# Patient Record
Sex: MALE | Race: WHITE | NOT HISPANIC OR LATINO | Employment: OTHER | URBAN - METROPOLITAN AREA
[De-identification: names, ages, dates, MRNs, and addresses within clinical notes are randomized per-mention and may not be internally consistent; named-entity substitution may affect disease eponyms.]

---

## 2017-02-23 ENCOUNTER — ALLSCRIPTS OFFICE VISIT (OUTPATIENT)
Dept: OTHER | Facility: OTHER | Age: 72
End: 2017-02-23

## 2017-02-23 DIAGNOSIS — R06.2 WHEEZING: ICD-10-CM

## 2017-02-23 DIAGNOSIS — I10 ESSENTIAL (PRIMARY) HYPERTENSION: ICD-10-CM

## 2017-02-24 ENCOUNTER — APPOINTMENT (OUTPATIENT)
Dept: LAB | Facility: CLINIC | Age: 72
End: 2017-02-24
Payer: MEDICARE

## 2017-02-24 ENCOUNTER — TRANSCRIBE ORDERS (OUTPATIENT)
Dept: LAB | Facility: CLINIC | Age: 72
End: 2017-02-24

## 2017-02-24 ENCOUNTER — GENERIC CONVERSION - ENCOUNTER (OUTPATIENT)
Dept: OTHER | Facility: OTHER | Age: 72
End: 2017-02-24

## 2017-02-24 DIAGNOSIS — I10 ESSENTIAL (PRIMARY) HYPERTENSION: ICD-10-CM

## 2017-02-24 DIAGNOSIS — R06.2 WHEEZING: ICD-10-CM

## 2017-02-24 LAB
ANION GAP SERPL CALCULATED.3IONS-SCNC: 9 MMOL/L (ref 4–13)
BUN SERPL-MCNC: 18 MG/DL (ref 5–25)
CALCIUM SERPL-MCNC: 9.2 MG/DL (ref 8.3–10.1)
CHLORIDE SERPL-SCNC: 104 MMOL/L (ref 100–108)
CO2 SERPL-SCNC: 28 MMOL/L (ref 21–32)
CREAT SERPL-MCNC: 1.18 MG/DL (ref 0.6–1.3)
GFR SERPL CREATININE-BSD FRML MDRD: >60 ML/MIN/1.73SQ M
GLUCOSE SERPL-MCNC: 111 MG/DL (ref 65–140)
NT-PROBNP SERPL-MCNC: 110 PG/ML
POTASSIUM SERPL-SCNC: 4.7 MMOL/L (ref 3.5–5.3)
SODIUM SERPL-SCNC: 141 MMOL/L (ref 136–145)

## 2017-02-24 PROCEDURE — 36415 COLL VENOUS BLD VENIPUNCTURE: CPT

## 2017-02-24 PROCEDURE — 83880 ASSAY OF NATRIURETIC PEPTIDE: CPT

## 2017-02-24 PROCEDURE — 80048 BASIC METABOLIC PNL TOTAL CA: CPT

## 2017-03-02 ENCOUNTER — ALLSCRIPTS OFFICE VISIT (OUTPATIENT)
Dept: OTHER | Facility: OTHER | Age: 72
End: 2017-03-02

## 2017-03-03 LAB
A/G RATIO (HISTORICAL): 1.9 (CALC) (ref 1–2.5)
ALBUMIN SERPL BCP-MCNC: 4.7 G/DL (ref 3.6–5.1)
ALP SERPL-CCNC: 65 U/L (ref 40–115)
ALT SERPL W P-5'-P-CCNC: 21 U/L (ref 9–46)
AST SERPL W P-5'-P-CCNC: 20 U/L (ref 10–35)
BILIRUB SERPL-MCNC: 1 MG/DL (ref 0.2–1.2)
BUN SERPL-MCNC: 22 MG/DL (ref 7–25)
BUN/CREA RATIO (HISTORICAL): ABNORMAL (CALC) (ref 6–22)
CALCIUM SERPL-MCNC: 10.1 MG/DL (ref 8.6–10.3)
CHLORIDE SERPL-SCNC: 99 MMOL/L (ref 98–110)
CHOLEST SERPL-MCNC: 128 MG/DL (ref 125–200)
CHOLEST/HDLC SERPL: 3.7 (CALC)
CO2 SERPL-SCNC: 26 MMOL/L (ref 20–31)
CREAT SERPL-MCNC: 1.16 MG/DL (ref 0.7–1.18)
DEPRECATED RDW RBC AUTO: 13.8 % (ref 11–15)
EGFR AFRICAN AMERICAN (HISTORICAL): 73 ML/MIN/1.73M2
EGFR-AMERICAN CALC (HISTORICAL): 63 ML/MIN/1.73M2
GAMMA GLOBULIN (HISTORICAL): 2.5 G/DL (CALC) (ref 1.9–3.7)
GLUCOSE (HISTORICAL): 123 MG/DL (ref 65–99)
HCT VFR BLD AUTO: 43.5 % (ref 38.5–50)
HDLC SERPL-MCNC: 35 MG/DL
HGB BLD-MCNC: 14.2 G/DL (ref 13.2–17.1)
LDL CHOLESTEROL (HISTORICAL): 62 MG/DL (CALC)
MCH RBC QN AUTO: 28.6 PG (ref 27–33)
MCHC RBC AUTO-ENTMCNC: 32.7 G/DL (ref 32–36)
MCV RBC AUTO: 87.5 FL (ref 80–100)
NON-HDL-CHOL (CHOL-HDL) (HISTORICAL): 93 MG/DL (CALC)
PLATELET # BLD AUTO: 191 THOUSAND/UL (ref 140–400)
PMV BLD AUTO: 10.3 FL (ref 7.5–12.5)
POTASSIUM SERPL-SCNC: 4.5 MMOL/L (ref 3.5–5.3)
RBC # BLD AUTO: 4.98 MILLION/UL (ref 4.2–5.8)
SODIUM SERPL-SCNC: 137 MMOL/L (ref 135–146)
TOTAL PROTEIN (HISTORICAL): 7.2 G/DL (ref 6.1–8.1)
TRIGL SERPL-MCNC: 157 MG/DL
TSH SERPL DL<=0.05 MIU/L-ACNC: 1.63 MIU/L (ref 0.4–4.5)
WBC # BLD AUTO: 7.1 THOUSAND/UL (ref 3.8–10.8)

## 2017-03-07 ENCOUNTER — GENERIC CONVERSION - ENCOUNTER (OUTPATIENT)
Dept: OTHER | Facility: OTHER | Age: 72
End: 2017-03-07

## 2018-01-10 NOTE — RESULT NOTES
Verified Results  (1) NT- BNP (PRO BRAIN NATRIURETIC PEPTIDE) [id number] 09:12AM Verneta Deter Order Number: [id number]     Test Name Result Flag Reference   NT-PRO  pg/mL  <125     (1) BASIC METABOLIC PROFILE [id number] 09:12AM Verneta Deter Order Number: [id number]     Test Name Result Flag Reference   GLUCOSE,RANDM 111 mg/dL     If the patient is fasting, the ADA then defines impaired fasting glucose as > 100 mg/dL and diabetes as > or equal to 123 mg/dL  SODIUM 141 mmol/L  136-145   POTASSIUM 4 7 mmol/L  3 5-5 3   CHLORIDE 104 mmol/L  100-108   CARBON DIOXIDE 28 mmol/L  21-32   ANION GAP (CALC) 9 mmol/L  4-13   BLOOD UREA NITROGEN 18 mg/dL  5-25   CREATININE 1 18 mg/dL  0 60-1 30   Standardized to IDMS reference method   CALCIUM 9 2 mg/dL  8 3-10 1   eGFR Non-African American      >60 0 ml/min/1 73sq [facility] [employer] Disease Education Program recommendations are as follows:  GFR calculation is accurate only with a steady state creatinine  Chronic Kidney disease less than 60 ml/min/1 73 sq  meters  Kidney failure less than 15 ml/min/1 73 sq  meters

## 2018-01-12 VITALS
TEMPERATURE: 97.1 F | DIASTOLIC BLOOD PRESSURE: 79 MMHG | SYSTOLIC BLOOD PRESSURE: 122 MMHG | HEIGHT: 73 IN | WEIGHT: 248 LBS | BODY MASS INDEX: 32.87 KG/M2

## 2018-01-12 NOTE — PROGRESS NOTES
Chief Complaint  Pt here for EKG for newly diagnosed afib  I completed an EKG  Pt was very upset when he came into office so I waited about 10 minutes to take his pressure and EKG      Active Problems    1  Actinic keratosis (702 0) (L57 0)   2  Benign essential hypertension (401 1) (I10)   3  Cough (786 2) (R05)   4  Diabetes mellitus (250 00) (E11 9)   5  Flu vaccine need (V04 81) (Z23)   6  Palpitation (785 1) (R00 2)   7  Paroxysmal atrial fibrillation (427 31) (I48 0)    Current Meds   1  GlipiZIDE ER 10 MG Oral Tablet Extended Release 24 Hour; Take 1 tablet daily; Therapy: 79UNW5034 to (Last Rx:51Kih6770) Ordered   2  Hydrochlorothiazide 25 MG Oral Tablet; Take 1 tablet daily; Therapy: 52NQD4383 to (Last Rx:85Nyk2202) Ordered   3  Losartan Potassium 100 MG Oral Tablet; TAKE 1 TABLET DAILY; Therapy: 05IJS7488 to (Evaluate:44Mbi7628); Last Rx:04Oct2016 Ordered   4  MetFORMIN HCl - 1000 MG Oral Tablet; Therapy: (Recorded:17Esg6048) to Recorded   5  Metoprolol Succinate ER 25 MG Oral Tablet Extended Release 24 Hour; Take 1 tablet   daily; Therapy: 65FHL6217 to (Evaluate:17Jdd0134)  Requested for: 04Oct2016; Last   Rx:90Yap1660 Ordered   6  Pradaxa 150 MG Oral Capsule; Therapy: (Recorded:10Drr2261) to Recorded   7  Savaysa 60 MG Oral Tablet; Take 1 tablet daily; Therapy: 55YQP4023 to (Abdi Lowers)  Requested for: 03QHL4549; Last   Rx:05Oct2016 Ordered    Allergies    1  No Known Drug Allergies    Vitals  Signs    Systolic: 149  Diastolic: 90  Heart Rate: 74  Height: 6 ft 1 in  Weight: 241 lb 6 oz  BMI Calculated: 31 85  BSA Calculated: 2 33  BP Cuff Size: Large    Future Appointments    Date/Time Provider Specialty Site   11/10/2016 04:00 PM CHASITY Saldaña   Cardiology [address]     Signatures   Electronically signed by : CHASITY Lancaster ; Oct 13 2016 11:02AM EST                       (Author)

## 2018-01-13 VITALS
WEIGHT: 248 LBS | HEIGHT: 73 IN | DIASTOLIC BLOOD PRESSURE: 60 MMHG | BODY MASS INDEX: 32.87 KG/M2 | TEMPERATURE: 97.1 F | SYSTOLIC BLOOD PRESSURE: 100 MMHG

## 2018-01-16 NOTE — RESULT NOTES
Verified Results  (1) COMPREHENSIVE METABOLIC PANEL 23LNX8428 73:19WK Eddy Aguilar     Test Name Result Flag Reference   GLUCOSE 123 mg/dL H 65-99   Fasting reference interval   UREA NITROGEN (BUN) 22 mg/dL  7-25   CREATININE 1 16 mg/dL  0 70-1 18   For patients >52years of age, the reference limit  for Creatinine is approximately 13% higher for people  identified as -American  eGFR NON-AFR  AMERICAN 63 mL/min/1 73m2  > OR = 60   eGFR AFRICAN AMERICAN 73 mL/min/1 73m2  > OR = 60   BUN/CREATININE RATIO   2-55   NOT APPLICABLE (calc)   SODIUM 137 mmol/L  135-146   POTASSIUM 4 5 mmol/L  3 5-5 3   CHLORIDE 99 mmol/L     CARBON DIOXIDE 26 mmol/L  20-31   CALCIUM 10 1 mg/dL  8 6-10 3   PROTEIN, TOTAL 7 2 g/dL  6 1-8 1   ALBUMIN 4 7 g/dL  3 6-5 1   GLOBULIN 2 5 g/dL (calc)  1 9-3 7   ALBUMIN/GLOBULIN RATIO 1 9 (calc)  1 0-2 5   BILIRUBIN, TOTAL 1 0 mg/dL  0 2-1 2   ALKALINE PHOSPHATASE 65 U/L     AST 20 U/L  10-35   ALT 21 U/L  9-46     (Q) CBC (H/H, RBC, INDICES, WBC, PLT) [id number] 12:00AM Carl Ca     Test Name Result Flag Reference   WHITE BLOOD CELL COUNT 7 1 Thousand/uL  3 8-10 8   RED BLOOD CELL COUNT 4 98 Million/uL  4 20-5 80   HEMOGLOBIN 14 2 g/dL  13 2-17 1   HEMATOCRIT 43 5 %  38 5-50 0   MCV 87 5 fL  80 0-100 0   MCH 28 6 pg  27 0-33 0   MCHC 32 7 g/dL  32 0-36 0   RDW 13 8 %  11 0-15 0   PLATELET COUNT 174 Thousand/uL  140-400   MPV 10 3 fL  7 5-12 5     (1) LIPID PANEL, FASTING 85HGQ0012 12:00AM Carl Ca     Test Name Result Flag Reference   CHOLESTEROL, TOTAL 128 mg/dL  125-200   HDL CHOLESTEROL 35 mg/dL L > OR = 40   TRIGLICERIDES 308 mg/dL H <150   LDL-CHOLESTEROL 62 mg/dL (calc)  <130   Desirable range <100 mg/dL for patients with CHD or  diabetes and <70 mg/dL for diabetic patients with  known heart disease  CHOL/HDLC RATIO 3 7 (calc)  < OR = 5 0   NON HDL CHOLESTEROL 93 mg/dL (calc)     Target for non-HDL cholesterol is 30 mg/dL higher than   LDL cholesterol target  (Q) TSH, 3RD GENERATION [id number] 12:00AM Amaya Anderson     Test Name Result Flag Reference   TSH 1 63 mIU/L  0 40-4 50

## 2018-01-18 NOTE — RESULT NOTES
Message   Please tell him chest x-ray was clear     Verified Results  * XR CHEST PA & LATERAL [id number] 03:07PM Madeline Alpers Order Number: [id number]     Test Name Result Flag Reference   XR CHEST PA & LATERAL (Report)     CHEST - DUAL ENERGY     INDICATION: Chronic cough     COMPARISON: None     VIEWS: PA (including soft tissue/bone algorithms) and lateral projections; 5 images     FINDINGS:        Cardiomediastinal silhouette appears unremarkable  The lungs are clear  No pneumothorax or pleural effusion  Visualized osseous structures appear within normal limits for the patient's age  IMPRESSION:     No active pulmonary disease         Workstation performed: [id number]     Signed by:   Pauline Torres MD   9/30/16

## 2018-08-03 ENCOUNTER — APPOINTMENT (RX ONLY)
Dept: URBAN - METROPOLITAN AREA CLINIC 101 | Facility: CLINIC | Age: 73
Setting detail: DERMATOLOGY
End: 2018-08-03

## 2018-08-03 VITALS — HEIGHT: 60 IN | HEART RATE: 80 BPM | WEIGHT: 240 LBS | RESPIRATION RATE: 14 BRPM

## 2018-08-03 DIAGNOSIS — L29.89 OTHER PRURITUS: ICD-10-CM

## 2018-08-03 DIAGNOSIS — L29.8 OTHER PRURITUS: ICD-10-CM

## 2018-08-03 DIAGNOSIS — L82.0 INFLAMED SEBORRHEIC KERATOSIS: ICD-10-CM

## 2018-08-03 PROBLEM — E13.9 OTHER SPECIFIED DIABETES MELLITUS WITHOUT COMPLICATIONS: Status: ACTIVE | Noted: 2018-08-03

## 2018-08-03 PROBLEM — E78.5 HYPERLIPIDEMIA, UNSPECIFIED: Status: ACTIVE | Noted: 2018-08-03

## 2018-08-03 PROBLEM — I10 ESSENTIAL (PRIMARY) HYPERTENSION: Status: ACTIVE | Noted: 2018-08-03

## 2018-08-03 PROCEDURE — ? COUNSELING

## 2018-08-03 PROCEDURE — 17110 DESTRUCTION B9 LES UP TO 14: CPT

## 2018-08-03 PROCEDURE — ? PRESCRIPTION

## 2018-08-03 PROCEDURE — 99213 OFFICE O/P EST LOW 20 MIN: CPT | Mod: 25

## 2018-08-03 PROCEDURE — ? OTHER

## 2018-08-03 PROCEDURE — ? LIQUID NITROGEN

## 2018-08-03 RX ORDER — HYDROXYZINE HYDROCHLORIDE 10 MG/1
TABLET, FILM COATED ORAL
Qty: 120 | Refills: 0 | Status: ERX | COMMUNITY
Start: 2018-08-03

## 2018-08-03 RX ADMIN — HYDROXYZINE HYDROCHLORIDE: 10 TABLET, FILM COATED ORAL at 14:52

## 2018-08-03 ASSESSMENT — LOCATION ZONE DERM
LOCATION ZONE: NECK
LOCATION ZONE: SCALP

## 2018-08-03 ASSESSMENT — LOCATION DETAILED DESCRIPTION DERM
LOCATION DETAILED: LEFT MEDIAL FRONTAL SCALP
LOCATION DETAILED: RIGHT CLAVICULAR NECK

## 2018-08-03 ASSESSMENT — LOCATION SIMPLE DESCRIPTION DERM
LOCATION SIMPLE: LEFT SCALP
LOCATION SIMPLE: RIGHT ANTERIOR NECK

## 2018-08-03 NOTE — PROCEDURE: OTHER
Detail Level: Detailed
Other (Free Text): If condition persists despite trial of oral antihistamines, consider scalp biopsy to rule out occult autoimmune bullous disease.
Note Text (......Xxx Chief Complaint.): This diagnosis correlates with the

## 2018-08-03 NOTE — PROCEDURE: LIQUID NITROGEN
Number Of Freeze-Thaw Cycles: 1 freeze-thaw cycle
Detail Level: Detailed
Medical Necessity Information: It is in your best interest to select a reason for this procedure from the list below. All of these items fulfill various CMS LCD requirements except the new and changing color options.
Medical Necessity Clause: This procedure was medically necessary because the lesions that were treated were:
Render Post-Care Instructions In Note?: no
Consent: The patient's consent was obtained including but not limited to risks of crusting, scabbing, blistering, scarring, darker or lighter pigmentary change, recurrence, incomplete removal and infection.
Post-Care Instructions: I reviewed with the patient in detail post-care instructions. Patient is to wear sunprotection, and avoid picking at any of the treated lesions. Pt may apply Vaseline to crusted or scabbing areas.

## 2019-03-01 ENCOUNTER — APPOINTMENT (RX ONLY)
Dept: URBAN - METROPOLITAN AREA CLINIC 101 | Facility: CLINIC | Age: 74
Setting detail: DERMATOLOGY
End: 2019-03-01

## 2019-03-01 DIAGNOSIS — L72.8 OTHER FOLLICULAR CYSTS OF THE SKIN AND SUBCUTANEOUS TISSUE: ICD-10-CM

## 2019-03-01 PROCEDURE — ? INTRALESIONAL KENALOG

## 2019-03-01 PROCEDURE — 10040 EXTRACTION: CPT

## 2019-03-01 PROCEDURE — ? COUNSELING

## 2019-03-01 PROCEDURE — 11900 INJECT SKIN LESIONS </W 7: CPT

## 2019-03-01 PROCEDURE — ? ACNE SURGERY

## 2019-03-01 ASSESSMENT — LOCATION ZONE DERM: LOCATION ZONE: LIP

## 2019-03-01 ASSESSMENT — LOCATION SIMPLE DESCRIPTION DERM: LOCATION SIMPLE: LEFT LIP

## 2019-03-01 ASSESSMENT — LOCATION DETAILED DESCRIPTION DERM: LOCATION DETAILED: LEFT LOWER CUTANEOUS LIP

## 2019-03-01 NOTE — PROCEDURE: ACNE SURGERY
Render Number Of Lesions Treated: yes
Detail Level: Detailed
Post-Care Instructions: I reviewed with the patient in detail post-care instructions. Patient is to keep the treatment areas dry overnight, and then apply bacitracin twice daily until healed. Patient may apply hydrogen peroxide soaks to remove any crusting.
Prep Text (Optional): Prior to removal the treatment areas were prepped in the usual fashion.
Acne Type: Comedonal Lesions
Consent was obtained and risks were reviewed including but not limited to scarring, infection, bleeding, scabbing, incomplete removal, and allergy to anesthesia.
Render Post-Care Instructions In Note?: no
Extraction Method: 11 blade and q-tip

## 2019-03-29 ENCOUNTER — APPOINTMENT (RX ONLY)
Dept: URBAN - METROPOLITAN AREA CLINIC 101 | Facility: CLINIC | Age: 74
Setting detail: DERMATOLOGY
End: 2019-03-29

## 2019-03-29 DIAGNOSIS — L82.1 OTHER SEBORRHEIC KERATOSIS: ICD-10-CM

## 2019-03-29 DIAGNOSIS — L81.4 OTHER MELANIN HYPERPIGMENTATION: ICD-10-CM

## 2019-03-29 DIAGNOSIS — L72.8 OTHER FOLLICULAR CYSTS OF THE SKIN AND SUBCUTANEOUS TISSUE: ICD-10-CM | Status: RESOLVED

## 2019-03-29 DIAGNOSIS — D18.0 HEMANGIOMA: ICD-10-CM

## 2019-03-29 PROBLEM — D18.01 HEMANGIOMA OF SKIN AND SUBCUTANEOUS TISSUE: Status: ACTIVE | Noted: 2019-03-29

## 2019-03-29 PROCEDURE — ? COUNSELING

## 2019-03-29 PROCEDURE — ? INVENTORY

## 2019-03-29 PROCEDURE — 99213 OFFICE O/P EST LOW 20 MIN: CPT

## 2019-03-29 ASSESSMENT — LOCATION SIMPLE DESCRIPTION DERM
LOCATION SIMPLE: GLABELLA
LOCATION SIMPLE: RIGHT UPPER ARM
LOCATION SIMPLE: RIGHT FOREARM
LOCATION SIMPLE: ABDOMEN
LOCATION SIMPLE: CHEST
LOCATION SIMPLE: LEFT UPPER ARM
LOCATION SIMPLE: LEFT LIP

## 2019-03-29 ASSESSMENT — LOCATION ZONE DERM
LOCATION ZONE: TRUNK
LOCATION ZONE: LIP
LOCATION ZONE: ARM
LOCATION ZONE: FACE

## 2019-03-29 ASSESSMENT — LOCATION DETAILED DESCRIPTION DERM
LOCATION DETAILED: LEFT ANTERIOR PROXIMAL UPPER ARM
LOCATION DETAILED: MIDDLE STERNUM
LOCATION DETAILED: RIGHT VENTRAL PROXIMAL FOREARM
LOCATION DETAILED: SUBXIPHOID
LOCATION DETAILED: GLABELLA
LOCATION DETAILED: EPIGASTRIC SKIN
LOCATION DETAILED: LEFT LOWER CUTANEOUS LIP
LOCATION DETAILED: RIGHT RIB CAGE
LOCATION DETAILED: RIGHT ANTERIOR DISTAL UPPER ARM
LOCATION DETAILED: LEFT ANTERIOR DISTAL UPPER ARM